# Patient Record
Sex: FEMALE | Race: BLACK OR AFRICAN AMERICAN | Employment: UNEMPLOYED | URBAN - METROPOLITAN AREA
[De-identification: names, ages, dates, MRNs, and addresses within clinical notes are randomized per-mention and may not be internally consistent; named-entity substitution may affect disease eponyms.]

---

## 2017-11-12 ENCOUNTER — HOSPITAL ENCOUNTER (EMERGENCY)
Age: 52
Discharge: HOME | End: 2017-11-12
Attending: FAMILY MEDICINE

## 2017-11-12 VITALS
SYSTOLIC BLOOD PRESSURE: 130 MMHG | HEIGHT: 60 IN | DIASTOLIC BLOOD PRESSURE: 100 MMHG | RESPIRATION RATE: 18 BRPM | OXYGEN SATURATION: 97 % | WEIGHT: 203 LBS | BODY MASS INDEX: 39.85 KG/M2 | HEART RATE: 100 BPM | TEMPERATURE: 98.5 F

## 2017-11-12 DIAGNOSIS — H61.23 BILATERAL IMPACTED CERUMEN: Primary | ICD-10-CM

## 2017-11-12 DIAGNOSIS — J30.89 ENVIRONMENTAL AND SEASONAL ALLERGIES: ICD-10-CM

## 2017-11-13 NOTE — UC PROVIDER NOTE
Patient is a 45 y.o. female presenting with earwax. The history is provided by the patient. Wax in Ear    This is a new (started after cleaning ears with Q-tip and then hearing became worse) problem. The current episode started 6 to 12 hours ago. The problem occurs constantly. Patient complains that both ears are affected. There has been no fever. The patient is experiencing no pain. Pertinent negatives include no ear discharge, no headaches and no cough. Her past medical history does not include chronic ear infection. Past Medical History:   Diagnosis Date    Hypertension         Past Surgical History:   Procedure Laterality Date    HX BREAST REDUCTION  5/97    HX HYSTERECTOMY  8/4/03    [facility]         Family History   Problem Relation Age of Onset    Hypertension Mother     Heart Disease Mother     Diabetes Mother         Social History     Social History    Marital status:      Spouse name: N/A    Number of children: N/A    Years of education: N/A     Occupational History    Not on file. Social History Main Topics    Smoking status: Never Smoker    Smokeless tobacco: Never Used    Alcohol use No    Drug use: No    Sexual activity: Yes     Birth control/ protection: Surgical     Other Topics Concern    Not on file     Social History Narrative                ALLERGIES: Review of patient's allergies indicates no known allergies. Review of Systems   Constitutional: Negative for fatigue and fever. HENT: Positive for congestion. Negative for ear discharge. +muffled hearing     Respiratory: Negative for cough. Allergic/Immunologic: Positive for environmental allergies. Neurological: Negative for dizziness and headaches. Vitals:    11/12/17 1935   BP: (!) 130/100   Pulse: 100   Resp: 18   Temp: 98.5 °F (36.9 °C)   SpO2: 97%   Weight: 92.1 kg (203 lb)   Height: 5' (1.524 m)       Physical Exam   Constitutional: She is oriented to person, place, and time.  She appears well-developed and well-nourished. She appears distressed. HENT:   Head: Normocephalic and atraumatic. Right Ear: External ear normal. Tympanic membrane is injected. Left Ear: External ear normal. Tympanic membrane is injected. Nose: Mucosal edema present. No rhinorrhea. Mouth/Throat: Mucous membranes are normal. No oropharyngeal exudate, posterior oropharyngeal edema or posterior oropharyngeal erythema. Trace redness to canal following ear irrigation     Eyes: Pupils are equal, round, and reactive to light. Cardiovascular: Normal rate and intact distal pulses. Pulmonary/Chest: Effort normal. No respiratory distress. Abdominal: Soft. She exhibits no distension. Musculoskeletal: She exhibits no edema. Neurological: She is alert and oriented to person, place, and time. Skin: Skin is warm and dry. She is not diaphoretic. Psychiatric: She has a normal mood and affect. Her behavior is normal. Judgment normal.   Nursing note and vitals reviewed. MDM     Differential Diagnosis; Clinical Impression; Plan:     CLINICAL IMPRESSION: Cerumen impaction, seasonal allergies    Plan: 1. Ear irrigated  2.  Flonase use advised          Procedures
